# Patient Record
Sex: FEMALE | Race: WHITE
[De-identification: names, ages, dates, MRNs, and addresses within clinical notes are randomized per-mention and may not be internally consistent; named-entity substitution may affect disease eponyms.]

---
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
## 2021-03-15 ENCOUNTER — HOSPITAL ENCOUNTER (EMERGENCY)
Dept: HOSPITAL 75 - ER FS | Age: 21
Discharge: HOME | End: 2021-03-15
Payer: MEDICAID

## 2021-03-15 VITALS — BODY MASS INDEX: 20.14 KG/M2 | WEIGHT: 117.95 LBS | HEIGHT: 64.02 IN

## 2021-03-15 VITALS — DIASTOLIC BLOOD PRESSURE: 74 MMHG | SYSTOLIC BLOOD PRESSURE: 116 MMHG

## 2021-03-15 DIAGNOSIS — S00.83XA: Primary | ICD-10-CM
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
## 2021-03-15 PROCEDURE — 84703 CHORIONIC GONADOTROPIN ASSAY: CPT

## 2021-03-15 PROCEDURE — 70486 CT MAXILLOFACIAL W/O DYE: CPT

## 2021-03-15 NOTE — ED EENT
History of Present Illness


General


Chief Complaint:  Facial Problems


Stated Complaint:  JAW INJ


Source:  patient





History of Present Illness


Date Seen by Provider:  Mar 15, 2021


Time Seen by Provider:  09:54


Initial Comments


20-year-old female presents with complaints of left-sided jaw pain.  She states 

that she was doing some stunts with cheerleading and hit her left jaw against 

another cheerleader's knee.  She denies any loss of consciousness.  She states 

this happened just prior to coming to the emergency department.  She has no pr

ior problems or injuries with her jaw.  She still feels like her teeth are 

matching and lining up like normal.  She is just having severe pain 7-8 out of 

10.  She has not taken anything for pain prior to coming to the ED.  Because of 

the severe pain her  advised her to come and get checked out.


Location Injury Occurred:  Cheerleading practice


Timing/Duration:  abrupt


Severity:  severe


Location:  facial (left jaw)


Prearrival Treatment:  no prearrival treatment


Presenting Symptoms/Injuries:  left jaw pain


Modifying Factors:  Worse With Activity


Associated Symptoms:  No change in hearing, No cough, No drooling, No ear dr

ainage; facial pain/swelling (left jaw); No fever, No malaise, No nasal 

congestion/drainage, No poor fluid intake, No poor solids intake, No sinus 

infection, No sore throat; tooth pain (teeth hurt from getting hit together when

hit jaw against knee of other cheerleader); No voice change





Allergies and Home Medications


Allergies


Coded Allergies:  


     No Known Drug Allergies (Unverified , 3/15/21)





Home Medications


Ibuprofen 600 Mg Tablet, 600 MG PO Q8H PRN for pain


   Prescribed by: LYSSA BULL on 3/15/21 1200





Patient Home Medication List


Home Medication List Reviewed:  Yes





Review of Systems


Review of Systems


Constitutional:  No chills, No dizziness, No fever


Eyes:  No Symptoms Reported


Ears:  No Symptoms Reported


Nose:  no symptoms reported


Mouth:  see HPI


Throat:  no symptoms reported


Respiratory:  no symptoms reported


Cardiovascular:  no symptoms reported


Gastrointestinal:  no symptoms reported


Musculoskeletal:  see HPI


Skin:  No change in color


Neurological:  Denies Numbness, Denies Paresthesia


Hematologic/Lymphatic:  No Symptoms Reported


Immunological/Allergic:  no symptoms reported





Past Medical-Social-Family Hx


Past Med/Social Hx:  Reviewed Nursing Past Med/Soc Hx


Past Medical History


Surgeries:  No


Respiratory:  No


Cardiac:  No


Neurological:  No


Genitourinary:  No


Gastrointestinal:  No


Musculoskeletal:  No


Endocrine:  No


HEENT:  No


Psychosocial:  No





Physical Exam


Vital Signs





Vital Signs - First Documented








 3/15/21





 10:03


 


Temp 37.4


 


Pulse 109


 


Resp 18


 


B/P (MAP) 116/74 (88)


 


Pulse Ox 99


 


O2 Delivery Room Air








Height, Weight, BMI


Height: '"


Weight: lbs. oz. kg;  BMI


Method:


General Appearance:  WD/WN, mild distress


Eyes:  bilateral eye PERRL, bilateral eye EOMI


Ears:  bilateral ear auricle normal, bilateral ear canal normal, bilateral ear 

TM normal


Mouth/Throat:  pharynx normal, mandibular swelling (left side with tenderness to

palpation. no crepitus. ), other (able to bite down on tongue depressor and hold

it between teeth on left and right sides)


Neck:  non-tender, full range of motion, supple, normal inspection


Cardiovascular:  normal peripheral pulses, regular rate, rhythm


Respiratory:  chest non-tender, lungs clear


Neurologic/Psychiatric:  CNs II-XII nml as tested, alert, normal mood/affect, 

oriented x 3


Skin:  normal color, warm/dry





Progress/Results/Core Measures


Results/Orders


My Orders





Orders - LYSSA BULL MD


Ibuprofen  Tablet (Motrin  Tablet) (3/15/21 10:42)


Ice: Apply To Affected Area (3/15/21 10:42)


Ct Maxillofacial Wo (3/15/21 10:42)


Urine Pregnancy Bedside (3/15/21 10:42)





Vital Signs/I&O











 3/15/21





 10:03


 


Temp 37.4


 


Pulse 109


 


Resp 18


 


B/P (MAP) 116/74 (88)


 


Pulse Ox 99


 


O2 Delivery Room Air











Progress


Progress Note #1:  


Progress Note


Ice pack, Ibuprofen and check CT scan to ensure no fracture.


Progress Note #2:  


Progress Note


No fracture or dislocation seen on CT scan.  Patient reports pain improved down 

to 3 or 4 out of 10 with treatment in the ED.  Counseled on follow-up and return

precautions.  Continue to use ice, rest, head elevation to limit swelling, 

NSAIDs.





Diagnostic Imaging





   Diagonstic Imaging:  CT


   Plain Films/CT/US/NM/MRI:  facial bones


Comments


                 [facility], [facility].


                                [place], [place]





NAME:   GLADIS CLAIRE J


MED REC#:   [id number]


ACCOUNT#:   [id number]


PT STATUS:   REG ER


:   2000


PHYSICIAN:   LYSSA BULL MD


ADMIT DATE:   03/15/21/ER FS


                                   ***Draft***


Date of Exam:03/15/21





CT MAXILLOFACIAL WO








PROCEDURE: CT maxillofacial without contrast.





TECHNIQUE: Multiple contiguous axial images were obtained through


the facial bones without the use of intravenous contrast. Auto


Exposure Controls were utilized during the CT exam to meet ALARA


standards for radiation dose reduction. 





INDICATION:  Fall. Left jaw injury. 





COMPARISON: None.





FINDINGS: No maxillofacial fractures. Normal alignment of the


temporomandibular joints. The mandible is intact. Minimal mucosal


thickening in the right sphenoid sinus. Skull base is intact.


Visualized upper cervical spine is negative. The orbits and


visualized intracranial contents are negative





IMPRESSION: No maxillofacial fractures. Specifically, the


mandible is intact. Normal alignment of the temporomandibular


joints.





  Dictated on workstation # [id number]








Dict:   03/15/21 1127


Trans:   03/15/21 1132


[facility] [id number]





Interpreted by:     ADIEL SUNG MD


Electronically signed by:





Departure


Impression





   Primary Impression:  


   Contusion of face


   Qualified Codes:  S00.83XA - Contusion of other part of head, initial 

   encounter


   Additional Impressions:  


   Mandible pain


   Contusion of jaw


   Qualified Codes:  S00.83XA - Contusion of other part of head, initial 

   encounter


   Injury while cheerleading


Disposition:  01 HOME, SELF-CARE


Condition:  Improved





Departure-Patient Inst.


Decision time for Depature:  11:56


Referrals:  


JANES ZAFAR MD





SELFKULDEEP MD (PCP/Family)


Primary Care Physician


Patient Instructions:  Contusion (DC)





Add. Discharge Instructions:  


Use ice 10-15 minutes every few hours as needed for pain and swelling to your 

jaw.





No fractures or broken bones seen on the CT scan. 





Follow a soft or liquid diet for 24-48 hours to avoid having to chew and cause 

more jaw pain.





If continued problems check back with clinic or ENT.





All discharge instructions reviewed with patient and/or family. Voiced 

understanding.


Scripts


Ibuprofen (Ibuprofen) 600 Mg Tablet


600 MG PO Q8H PRN for pain for 10 Days, #30 TAB 0 Refills


   Prov: LYSSA BULL MD         3/15/21


Work/School Note:  School/Childcare Release   Date Seen in the Emergency 

Department:  Mar 15, 2021


   Time Dismissed from Emergency Department:  12:00


   Return to School:  Mar 15, 2021


   Restrictions:  No Restrictions





Images


Head/Face











1 - Moderate (Moderate tenderness to the left mandible with palpation and 

movement), Swelling (Mild swelling to the left mandible), Tenderness (Left side 

of the mandible)














LYSSA BULL MD               Mar 15, 2021 10:13

## 2021-03-15 NOTE — DIAGNOSTIC IMAGING REPORT
PROCEDURE: CT maxillofacial without contrast.



TECHNIQUE: Multiple contiguous axial images were obtained through

the facial bones without the use of intravenous contrast. Auto

Exposure Controls were utilized during the CT exam to meet ALARA

standards for radiation dose reduction. 



INDICATION:  Fall. Left jaw injury. 



COMPARISON: None.



FINDINGS: No maxillofacial fractures. Normal alignment of the

temporomandibular joints. The mandible is intact. Minimal mucosal

thickening in the right sphenoid sinus. Skull base is intact.

Visualized upper cervical spine is negative. The orbits and

visualized intracranial contents are negative



IMPRESSION: No maxillofacial fractures. Specifically, the

mandible is intact. Normal alignment of the temporomandibular

joints.



Dictated by: 



  Dictated on workstation # [id number]